# Patient Record
Sex: MALE | Race: WHITE | NOT HISPANIC OR LATINO | Employment: OTHER | URBAN - NONMETROPOLITAN AREA
[De-identification: names, ages, dates, MRNs, and addresses within clinical notes are randomized per-mention and may not be internally consistent; named-entity substitution may affect disease eponyms.]

---

## 2023-05-01 ENCOUNTER — OFFICE VISIT (OUTPATIENT)
Dept: PRIMARY CARE | Facility: CLINIC | Age: 63
End: 2023-05-01
Payer: COMMERCIAL

## 2023-05-01 VITALS
HEIGHT: 72 IN | SYSTOLIC BLOOD PRESSURE: 128 MMHG | DIASTOLIC BLOOD PRESSURE: 80 MMHG | WEIGHT: 204.7 LBS | HEART RATE: 56 BPM | BODY MASS INDEX: 27.73 KG/M2

## 2023-05-01 DIAGNOSIS — Z12.11 COLON CANCER SCREENING: Primary | ICD-10-CM

## 2023-05-01 DIAGNOSIS — J44.9 MODERATE COPD (CHRONIC OBSTRUCTIVE PULMONARY DISEASE) (MULTI): ICD-10-CM

## 2023-05-01 DIAGNOSIS — Z12.5 ENCOUNTER FOR SCREENING FOR MALIGNANT NEOPLASM OF PROSTATE: ICD-10-CM

## 2023-05-01 DIAGNOSIS — Z00.00 HEALTHCARE MAINTENANCE: ICD-10-CM

## 2023-05-01 DIAGNOSIS — Z13.220 SCREENING CHOLESTEROL LEVEL: ICD-10-CM

## 2023-05-01 PROCEDURE — 1036F TOBACCO NON-USER: CPT | Performed by: FAMILY MEDICINE

## 2023-05-01 PROCEDURE — 99214 OFFICE O/P EST MOD 30 MIN: CPT | Performed by: FAMILY MEDICINE

## 2023-05-01 RX ORDER — ALBUTEROL SULFATE 90 UG/1
AEROSOL, METERED RESPIRATORY (INHALATION)
COMMUNITY
End: 2023-05-12 | Stop reason: SDUPTHER

## 2023-05-01 ASSESSMENT — PATIENT HEALTH QUESTIONNAIRE - PHQ9
2. FEELING DOWN, DEPRESSED OR HOPELESS: NOT AT ALL
SUM OF ALL RESPONSES TO PHQ9 QUESTIONS 1 AND 2: 0
1. LITTLE INTEREST OR PLEASURE IN DOING THINGS: NOT AT ALL

## 2023-05-01 NOTE — PROGRESS NOTES
Subjective   Patient ID: Black Mao is a 62 y.o. male who presents for 6 month .    HPI   First day of detention   Flipping houses now   States feels better respiration since no longer in shop     Mod copd  Albuterol daily   Spiriva made no difference     Alopecia   Left forehead has area that is rough      Review of Systems    Objective   /80   Pulse 56   Ht 1.829 m (6')   Wt 92.9 kg (204 lb 11.2 oz)   BMI 27.76 kg/m²     Physical Exam  Constitutional:       Appearance: Normal appearance.   HENT:      Head: Normocephalic and atraumatic.   Eyes:      Conjunctiva/sclera: Conjunctivae normal.      Pupils: Pupils are equal, round, and reactive to light.   Cardiovascular:      Rate and Rhythm: Normal rate and regular rhythm.      Heart sounds: Normal heart sounds.   Pulmonary:      Effort: Pulmonary effort is normal.      Breath sounds: Normal breath sounds.   Lymphadenopathy:      Cervical: No cervical adenopathy.   Skin:     Coloration: Skin is not jaundiced.   Neurological:      General: No focal deficit present.      Mental Status: He is alert and oriented to person, place, and time.   Psychiatric:         Mood and Affect: Mood normal.         Behavior: Behavior normal.         Thought Content: Thought content normal.         Judgment: Judgment normal.         Assessment/Plan   Diagnoses and all orders for this visit:  Colon cancer screening  -     Cologuard® colon cancer screening; Future  Healthcare maintenance  -     Prostate Specific Antigen, Screen; Future  Encounter for screening for malignant neoplasm of prostate  -     Prostate Specific Antigen, Screen; Future  Screening cholesterol level  -     Lipid Panel; Future  Moderate COPD (chronic obstructive pulmonary disease) (CMS/HCC)  Other orders  -     Follow Up In Primary Care; Future

## 2023-05-09 ENCOUNTER — LAB (OUTPATIENT)
Dept: LAB | Facility: LAB | Age: 63
End: 2023-05-09
Payer: COMMERCIAL

## 2023-05-09 DIAGNOSIS — Z00.00 HEALTHCARE MAINTENANCE: ICD-10-CM

## 2023-05-09 DIAGNOSIS — Z12.5 ENCOUNTER FOR SCREENING FOR MALIGNANT NEOPLASM OF PROSTATE: ICD-10-CM

## 2023-05-09 DIAGNOSIS — Z13.220 SCREENING CHOLESTEROL LEVEL: ICD-10-CM

## 2023-05-09 LAB
CHOLESTEROL (MG/DL) IN SER/PLAS: 242 MG/DL (ref 0–199)
CHOLESTEROL IN HDL (MG/DL) IN SER/PLAS: 44 MG/DL
CHOLESTEROL/HDL RATIO: 5.5
LDL: ABNORMAL MG/DL (ref 0–99)
NON HDL CHOLESTEROL: 198 MG/DL
PROSTATE SPECIFIC ANTIGEN,SCREEN: 0.53 NG/ML (ref 0–4)
TRIGLYCERIDE (MG/DL) IN SER/PLAS: 426 MG/DL (ref 0–149)
VLDL: ABNORMAL MG/DL (ref 0–40)

## 2023-05-09 PROCEDURE — 80061 LIPID PANEL: CPT

## 2023-05-09 PROCEDURE — 84153 ASSAY OF PSA TOTAL: CPT

## 2023-05-09 PROCEDURE — 36415 COLL VENOUS BLD VENIPUNCTURE: CPT

## 2023-05-12 DIAGNOSIS — J44.9 MODERATE COPD (CHRONIC OBSTRUCTIVE PULMONARY DISEASE) (MULTI): ICD-10-CM

## 2023-05-12 RX ORDER — ALBUTEROL SULFATE 90 UG/1
AEROSOL, METERED RESPIRATORY (INHALATION)
Qty: 18 G | Refills: 1 | Status: SHIPPED | OUTPATIENT
Start: 2023-05-12 | End: 2023-07-31 | Stop reason: SDUPTHER

## 2023-05-13 LAB — NONINV COLON CA DNA+OCC BLD SCRN STL QL: POSITIVE

## 2023-05-15 ENCOUNTER — TELEPHONE (OUTPATIENT)
Dept: PRIMARY CARE | Facility: CLINIC | Age: 63
End: 2023-05-15

## 2023-05-15 DIAGNOSIS — R19.5 POSITIVE COLORECTAL CANCER SCREENING USING COLOGUARD TEST: Primary | ICD-10-CM

## 2023-05-15 DIAGNOSIS — E78.00 HIGH CHOLESTEROL: Primary | ICD-10-CM

## 2023-05-15 RX ORDER — ROSUVASTATIN CALCIUM 10 MG/1
10 TABLET, COATED ORAL DAILY
Qty: 30 TABLET | Refills: 11 | Status: SHIPPED | OUTPATIENT
Start: 2023-05-15 | End: 2024-05-02 | Stop reason: SDUPTHER

## 2023-06-01 ENCOUNTER — TELEPHONE (OUTPATIENT)
Dept: PRIMARY CARE | Facility: CLINIC | Age: 63
End: 2023-06-01
Payer: COMMERCIAL

## 2023-06-01 NOTE — TELEPHONE ENCOUNTER
Has an appointment for a colonoscopy June 19. He has new insurance and needs our office to call the insurance to tell them the procedure is going to be done. Asking for a call back.

## 2023-06-19 ENCOUNTER — HOSPITAL ENCOUNTER (OUTPATIENT)
Dept: DATA CONVERSION | Facility: HOSPITAL | Age: 63
End: 2023-06-19
Attending: SURGERY | Admitting: SURGERY
Payer: COMMERCIAL

## 2023-06-19 DIAGNOSIS — Z12.11 ENCOUNTER FOR SCREENING FOR MALIGNANT NEOPLASM OF COLON: ICD-10-CM

## 2023-06-19 DIAGNOSIS — D12.5 BENIGN NEOPLASM OF SIGMOID COLON: ICD-10-CM

## 2023-06-19 DIAGNOSIS — D12.3 BENIGN NEOPLASM OF TRANSVERSE COLON: ICD-10-CM

## 2023-06-19 DIAGNOSIS — K57.30 DIVERTICULOSIS OF LARGE INTESTINE WITHOUT PERFORATION OR ABSCESS WITHOUT BLEEDING: ICD-10-CM

## 2023-06-19 DIAGNOSIS — R19.5 OTHER FECAL ABNORMALITIES: ICD-10-CM

## 2023-06-19 DIAGNOSIS — J44.9 CHRONIC OBSTRUCTIVE PULMONARY DISEASE, UNSPECIFIED (MULTI): ICD-10-CM

## 2023-06-22 LAB
COMPLETE PATHOLOGY REPORT: NORMAL
CONVERTED CLINICAL DIAGNOSIS-HISTORY: NORMAL
CONVERTED FINAL DIAGNOSIS: NORMAL
CONVERTED FINAL REPORT PDF LINK TO COPY AND PASTE: NORMAL
CONVERTED GROSS DESCRIPTION: NORMAL

## 2023-07-31 DIAGNOSIS — J44.9 MODERATE COPD (CHRONIC OBSTRUCTIVE PULMONARY DISEASE) (MULTI): ICD-10-CM

## 2023-07-31 RX ORDER — ALBUTEROL SULFATE 90 UG/1
AEROSOL, METERED RESPIRATORY (INHALATION)
Qty: 18 G | Refills: 1 | Status: SHIPPED | OUTPATIENT
Start: 2023-07-31 | End: 2023-10-06 | Stop reason: SDUPTHER

## 2023-09-30 NOTE — H&P
History of Present Illness:   Admission Reason: colon cancer screening   HPI:    ALYSIA BURNETTE is a 63 year old Male in need of colonoscopy.  He had a Cologuard test that came back positive.  His last colonoscopy was November 2011 by Dr. Nagy.  He had no polyps.  He has no family history of colon or rectal cancer. He has bowel movements at least once daily.  He is not on any stool softeners, fiber supplements, or laxatives.  He is not on any  blood thinners or antiplatelet agents.  He denies ever seeing blood in the stool.    Comorbidities:   Comorbidites:  ·  Comorbid Conditions chronic obstructive pulmonary disease   ·  COPD unknown     Past Medical/Surgical History:        Medical History:   COPD (chronic obstructive pulmonary disease):    High cholesterol:    Amputation of finger:        Surg History:   History of tonsillectomy and adenoidectomy:    History of inguinal hernia repair:    Previous back surgery:    History of colonoscopy:    History of hand surgery:     Family History:   Family History:    Family History:   No family history of colon or rectal cancer.    Social History:  Former smoker. No alcohol use.    ROS:  Constitutional:  no fever, sweats, and chills  Cardiovascular: No chest pain  Respiratory: No cough or shortness of breath  Gastrointestinal: No blood in stool or dark bowel movements. No change in bowel movements. No significant abdominal pain, mild cramping with bowel prep.  Genitourinary: no dysuria  Musculoskeletal: no weakness or swelling  Integumentary: no rashes  Neurological: no confusion  Endocrine: no heat or cold intolerance  Heme/Lymph: no easy bruising or bleeding           Allergies:  ·  No Known Allergies :     Medications Prior to Admission:   Admission Medication Reconciliation has not been completed for this patient.    Objective:   Physical Exam by System:    Constitutional: NAD, lying supine in bed   Eyes: no scleral icterus   Respiratory/Thorax: no labored  breathing   Cardiovascular: NSR   Gastrointestinal: soft, non-distended   Musculoskeletal: full range of motion   Extremities: no peripheral edema   Neurological: alert, oriented x3   Psychological: normal affect   Skin: no jaundice     Assessment and Plan:   Assessment:    Mr. Mao is a 64yo male in need of colonoscopy in work-up of positive Cologuard test.  Risks and benefit of colonoscopy were discussed with the patient.  This  included risk of bleeding, perforation, missed polyps, incomplete colonoscopy, and potential need for additional procedures pending findings.  He was agreeable to proceed.      Electronic Signatures:  Mary Grace Gutierrez)  (Signed 19-Jun-2023 09:32)   Authored: History of Present Illness, Comorbidities,  Past Medical/Surgical History, Family History, Allergies, Medications Prior to Admission, Objective, Assessment and Plan, Note Completion      Last Updated: 19-Jun-2023 09:32 by Mary Grace Gutierrez)

## 2023-10-06 DIAGNOSIS — J44.9 MODERATE COPD (CHRONIC OBSTRUCTIVE PULMONARY DISEASE) (MULTI): ICD-10-CM

## 2023-10-06 RX ORDER — ALBUTEROL SULFATE 90 UG/1
AEROSOL, METERED RESPIRATORY (INHALATION)
Qty: 18 G | Refills: 3 | Status: SHIPPED | OUTPATIENT
Start: 2023-10-06 | End: 2023-12-26 | Stop reason: SDUPTHER

## 2023-11-10 ENCOUNTER — TELEMEDICINE (OUTPATIENT)
Dept: PRIMARY CARE | Facility: CLINIC | Age: 63
End: 2023-11-10
Payer: COMMERCIAL

## 2023-11-10 DIAGNOSIS — U07.1 COVID-19 VIRUS INFECTION: Primary | ICD-10-CM

## 2023-11-10 PROCEDURE — 99213 OFFICE O/P EST LOW 20 MIN: CPT | Performed by: FAMILY MEDICINE

## 2023-11-10 NOTE — PROGRESS NOTES
Subjective   Patient ID: Black Mao is a 63 y.o. male who presents for pt started with cold sx 11-6-23, tested pos COVID 11-8-23 (Pt c/o cold symptoms and feeing tired ).    HPI   Video virtual visit.  Monday night November 6 sri had fevers chills achy mild headache tested for positive on the eighth wife also is positive COVID this is the first time he had COVID he has taken Motrin x2.  He would like to start Paxlovid.  I instructed him to not take Crestor while on the Paxlovid.    No respiratory distress  Review of Systems    Objective   There were no vitals taken for this visit.    Physical Exam  Constitutional:       Appearance: Normal appearance.   HENT:      Head: Normocephalic and atraumatic.   Eyes:      Conjunctiva/sclera: Conjunctivae normal.      Pupils: Pupils are equal, round, and reactive to light.   Cardiovascular:      Rate and Rhythm: Regular rhythm.   Pulmonary:      Effort: Pulmonary effort is normal.   Lymphadenopathy:      Cervical: No cervical adenopathy.   Skin:     Coloration: Skin is not jaundiced.   Neurological:      General: No focal deficit present.      Mental Status: He is alert and oriented to person, place, and time.   Psychiatric:         Mood and Affect: Mood normal.         Behavior: Behavior normal.         Thought Content: Thought content normal.         Judgment: Judgment normal.         Assessment/Plan   Diagnoses and all orders for this visit:  COVID-19 virus infection  -     nirmatrelvir-ritonavir (PAXLOVID) 300 mg (150 mg x 2)-100 mg tablet therapy pack; Take 3 tablets by mouth 2 times a day for 5 days. Follow the instructions on the package  Recommend vitamin C vitamin D zinc.

## 2023-12-26 DIAGNOSIS — J44.9 MODERATE COPD (CHRONIC OBSTRUCTIVE PULMONARY DISEASE) (MULTI): ICD-10-CM

## 2023-12-26 NOTE — TELEPHONE ENCOUNTER
Medication Refill Request    Medication:  albuterol inhaler    Pharmacy:  [facility]    Last OV:  11/10/23  Upcoming appointment:  5/2/24    ORDER PENDED

## 2023-12-26 NOTE — TELEPHONE ENCOUNTER
albuterol 90 mcg/actuation inhaler [[id number]]    Order Details  Dose, Route, Frequency: As Directed   Dispense Quantity: 18 g Refills: 3          Sig: INHALE 2 PUFFS BY MOUTH EVERY 4 HOURS AS NEEDED FOR SHORTNESS OF BREATH & FOR WHEEZING         Start Date: 10/06/23 End Date: --   Written Date: 10/06/23 Rx Expiration Date: 10/05/24        Associated Diagnoses: Moderate COPD (chronic obstructive pulmonary disease) (CMS/[facility]) [J44.9]   Original Order: albuterol 90 mcg/actuation inhaler [[id number]]   [facility] IS [facility]. PT PHONE NUMBER IS [id number]. THANK YOU.

## 2023-12-27 RX ORDER — ALBUTEROL SULFATE 90 UG/1
AEROSOL, METERED RESPIRATORY (INHALATION)
Qty: 18 G | Refills: 3 | Status: SHIPPED | OUTPATIENT
Start: 2023-12-27 | End: 2024-05-02 | Stop reason: SDUPTHER

## 2024-05-02 ENCOUNTER — OFFICE VISIT (OUTPATIENT)
Dept: PRIMARY CARE | Facility: CLINIC | Age: 64
End: 2024-05-02
Payer: COMMERCIAL

## 2024-05-02 ENCOUNTER — LAB (OUTPATIENT)
Dept: LAB | Facility: LAB | Age: 64
End: 2024-05-02
Payer: COMMERCIAL

## 2024-05-02 VITALS
TEMPERATURE: 97.6 F | DIASTOLIC BLOOD PRESSURE: 78 MMHG | BODY MASS INDEX: 28.22 KG/M2 | OXYGEN SATURATION: 98 % | WEIGHT: 208.1 LBS | HEART RATE: 54 BPM | SYSTOLIC BLOOD PRESSURE: 124 MMHG

## 2024-05-02 DIAGNOSIS — Z00.00 HEALTHCARE MAINTENANCE: ICD-10-CM

## 2024-05-02 DIAGNOSIS — E78.00 HIGH CHOLESTEROL: ICD-10-CM

## 2024-05-02 DIAGNOSIS — J44.9 MODERATE COPD (CHRONIC OBSTRUCTIVE PULMONARY DISEASE) (MULTI): ICD-10-CM

## 2024-05-02 DIAGNOSIS — M25.512 ACUTE PAIN OF BOTH SHOULDERS: ICD-10-CM

## 2024-05-02 DIAGNOSIS — Z12.5 ENCOUNTER FOR SCREENING FOR MALIGNANT NEOPLASM OF PROSTATE: Primary | ICD-10-CM

## 2024-05-02 DIAGNOSIS — Z12.5 ENCOUNTER FOR SCREENING FOR MALIGNANT NEOPLASM OF PROSTATE: ICD-10-CM

## 2024-05-02 DIAGNOSIS — M25.511 ACUTE PAIN OF BOTH SHOULDERS: ICD-10-CM

## 2024-05-02 LAB
ANION GAP SERPL CALC-SCNC: 11 MMOL/L (ref 10–20)
BUN SERPL-MCNC: 14 MG/DL (ref 6–23)
CALCIUM SERPL-MCNC: 9.2 MG/DL (ref 8.6–10.3)
CHLORIDE SERPL-SCNC: 106 MMOL/L (ref 98–107)
CHOLEST SERPL-MCNC: 155 MG/DL (ref 0–199)
CHOLESTEROL/HDL RATIO: 3.6
CO2 SERPL-SCNC: 27 MMOL/L (ref 21–32)
CREAT SERPL-MCNC: 0.8 MG/DL (ref 0.5–1.3)
EGFRCR SERPLBLD CKD-EPI 2021: >90 ML/MIN/1.73M*2
GLUCOSE SERPL-MCNC: 103 MG/DL (ref 74–99)
HDLC SERPL-MCNC: 43 MG/DL
LDLC SERPL CALC-MCNC: 78 MG/DL
NON HDL CHOLESTEROL: 112 MG/DL (ref 0–149)
POTASSIUM SERPL-SCNC: 4.1 MMOL/L (ref 3.5–5.3)
PSA SERPL-MCNC: 0.93 NG/ML
SODIUM SERPL-SCNC: 140 MMOL/L (ref 136–145)
TRIGL SERPL-MCNC: 170 MG/DL (ref 0–149)
VLDL: 34 MG/DL (ref 0–40)

## 2024-05-02 PROCEDURE — 1036F TOBACCO NON-USER: CPT | Performed by: FAMILY MEDICINE

## 2024-05-02 PROCEDURE — 36415 COLL VENOUS BLD VENIPUNCTURE: CPT

## 2024-05-02 PROCEDURE — 80061 LIPID PANEL: CPT

## 2024-05-02 PROCEDURE — 84153 ASSAY OF PSA TOTAL: CPT

## 2024-05-02 PROCEDURE — 80048 BASIC METABOLIC PNL TOTAL CA: CPT

## 2024-05-02 PROCEDURE — 99214 OFFICE O/P EST MOD 30 MIN: CPT | Performed by: FAMILY MEDICINE

## 2024-05-02 RX ORDER — ROSUVASTATIN CALCIUM 10 MG/1
10 TABLET, COATED ORAL DAILY
Qty: 30 TABLET | Refills: 11 | Status: SHIPPED | OUTPATIENT
Start: 2024-05-02 | End: 2025-05-02

## 2024-05-02 RX ORDER — ALBUTEROL SULFATE 90 UG/1
AEROSOL, METERED RESPIRATORY (INHALATION)
Qty: 18 G | Refills: 3 | Status: SHIPPED | OUTPATIENT
Start: 2024-05-02

## 2024-05-02 ASSESSMENT — ENCOUNTER SYMPTOMS
DYSURIA: 0
HEMATURIA: 0

## 2024-05-02 NOTE — PROGRESS NOTES
Subjective   Patient ID: Black Mao is a 63 y.o. male who presents for Hyperlipidemia and COPD.    Hyperlipidemia  Pertinent negatives include no chest pain.        Mod copd per pfts 2022 with no sign improvement with BD   Albuterol daily   Spiriva made no difference   No sneezing no watery itchy eyes    Positive cologuard 2023   Colonoscopy June 2023 repeat 3 years       Both shoulder pain  No limitations   Hurts laying down   X 1 months   Repetitive none  No weakness   On statin 1 year       Review of Systems   Cardiovascular:  Negative for chest pain.   Genitourinary:  Negative for dysuria and hematuria.       Objective   /78 (BP Location: Left arm, Patient Position: Sitting)   Pulse 54   Temp 36.4 °C (97.6 °F)   Wt 94.4 kg (208 lb 1.6 oz)   SpO2 98%   BMI 28.22 kg/m²     Physical Exam  Constitutional:       Appearance: Normal appearance.   HENT:      Head: Normocephalic and atraumatic.   Eyes:      Conjunctiva/sclera: Conjunctivae normal.      Pupils: Pupils are equal, round, and reactive to light.   Cardiovascular:      Rate and Rhythm: Normal rate and regular rhythm.      Heart sounds: Normal heart sounds.   Pulmonary:      Effort: Pulmonary effort is normal.      Breath sounds: Normal breath sounds.   Musculoskeletal:      Comments: Marshall shoulders from normal ms   Has min tenderness over the prox biceps tendon marshall     Lymphadenopathy:      Cervical: No cervical adenopathy.   Skin:     Coloration: Skin is not jaundiced.   Neurological:      General: No focal deficit present.      Mental Status: He is alert and oriented to person, place, and time.   Psychiatric:         Mood and Affect: Mood normal.         Behavior: Behavior normal.         Thought Content: Thought content normal.         Judgment: Judgment normal.         Assessment/Plan   Diagnoses and all orders for this visit:  Encounter for screening for malignant neoplasm of prostate  -     Prostate Specific Antigen, Screen; Future  High  cholesterol  -     rosuvastatin (Crestor) 10 mg tablet; Take 1 tablet (10 mg) by mouth once daily.  -     Basic Metabolic Panel; Future  -     Lipid Panel; Future  Moderate COPD (chronic obstructive pulmonary disease) (Multi)  -     albuterol 90 mcg/actuation inhaler; INHALE 2 PUFFS BY MOUTH EVERY 4 HOURS AS NEEDED FOR SHORTNESS OF BREATH & FOR WHEEZING  Healthcare maintenance  -     Prostate Specific Antigen, Screen; Future  Acute pain of both shoulders  Other orders  -     Follow Up In Primary Care    HEP given for shoulders     I don't think shoulder pain is from statin since not myoathy but more tendonitis

## 2024-11-15 DIAGNOSIS — J44.9 MODERATE COPD (CHRONIC OBSTRUCTIVE PULMONARY DISEASE) (MULTI): ICD-10-CM

## 2024-11-15 RX ORDER — ALBUTEROL SULFATE 90 UG/1
INHALANT RESPIRATORY (INHALATION)
Qty: 18 G | Refills: 3 | Status: SHIPPED | OUTPATIENT
Start: 2024-11-15

## 2024-11-15 NOTE — TELEPHONE ENCOUNTER
Medication Refill Request    Medication:  albuterol inhaler    Pharmacy:  [facility]    Last OV:  5/2/24  Upcoming appointment:  5/2/25    ORDER PENDED

## 2025-03-13 DIAGNOSIS — J44.9 MODERATE COPD (CHRONIC OBSTRUCTIVE PULMONARY DISEASE) (MULTI): ICD-10-CM

## 2025-03-13 RX ORDER — ALBUTEROL SULFATE 90 UG/1
INHALANT RESPIRATORY (INHALATION)
Qty: 18 G | Refills: 3 | Status: SHIPPED | OUTPATIENT
Start: 2025-03-13

## 2025-05-02 ENCOUNTER — APPOINTMENT (OUTPATIENT)
Dept: PRIMARY CARE | Facility: CLINIC | Age: 65
End: 2025-05-02
Payer: COMMERCIAL

## 2025-05-02 VITALS
BODY MASS INDEX: 29.5 KG/M2 | WEIGHT: 217.5 LBS | SYSTOLIC BLOOD PRESSURE: 130 MMHG | OXYGEN SATURATION: 95 % | DIASTOLIC BLOOD PRESSURE: 70 MMHG | HEART RATE: 56 BPM

## 2025-05-02 DIAGNOSIS — J44.9 MODERATE COPD (CHRONIC OBSTRUCTIVE PULMONARY DISEASE) (MULTI): ICD-10-CM

## 2025-05-02 DIAGNOSIS — Z12.5 ENCOUNTER FOR SCREENING FOR MALIGNANT NEOPLASM OF PROSTATE: ICD-10-CM

## 2025-05-02 DIAGNOSIS — E78.00 HIGH CHOLESTEROL: ICD-10-CM

## 2025-05-02 DIAGNOSIS — Z00.00 HEALTHCARE MAINTENANCE: ICD-10-CM

## 2025-05-02 DIAGNOSIS — B35.1 ONYCHOMYCOSIS: Primary | ICD-10-CM

## 2025-05-02 PROCEDURE — 1036F TOBACCO NON-USER: CPT | Performed by: FAMILY MEDICINE

## 2025-05-02 PROCEDURE — 99214 OFFICE O/P EST MOD 30 MIN: CPT | Performed by: FAMILY MEDICINE

## 2025-05-02 RX ORDER — ROSUVASTATIN CALCIUM 10 MG/1
10 TABLET, COATED ORAL DAILY
Qty: 100 TABLET | Refills: 3 | Status: SHIPPED | OUTPATIENT
Start: 2025-05-02

## 2025-05-02 RX ORDER — TERBINAFINE HYDROCHLORIDE 250 MG/1
250 TABLET ORAL DAILY
Qty: 90 TABLET | Refills: 0 | Status: SHIPPED | OUTPATIENT
Start: 2025-05-02 | End: 2025-07-31

## 2025-05-02 RX ORDER — FLUTICASONE PROPIONATE AND SALMETEROL 250; 50 UG/1; UG/1
1 POWDER RESPIRATORY (INHALATION)
Qty: 60 EACH | Refills: 0 | Status: SHIPPED | OUTPATIENT
Start: 2025-05-02 | End: 2026-05-02

## 2025-05-02 ASSESSMENT — ENCOUNTER SYMPTOMS
PALPITATIONS: 0
DIFFICULTY URINATING: 0
POLYPHAGIA: 0
COUGH: 0
SHORTNESS OF BREATH: 0
POLYDIPSIA: 0

## 2025-05-02 NOTE — PATIENT INSTRUCTIONS
Current weight: 98.7 kg (217 lb 8 oz)  Weight change since last visit (-) denotes wt loss 9.4 lbs   Weight loss needed to achieve BMI 25:   Lbs  Weight loss needed to achieve BMI 30:   Lbs

## 2025-05-02 NOTE — PROGRESS NOTES
Subjective   Patient ID: Black Mao is a 64 y.o. male who presents for Hyperlipidemia and Moderate COPD.  HPI  Patient Health Questionnaire-2 Score: 0 (5/2/2025  8:16 AM)     P20 will wait until 65 yo    Left great toenail ingrown and tries to dig out himself     Will treat with terfinafine     Mod copd per pfts 2022 with no sign improvement with BD   Albuterol daily   Spiriva made no difference   Will try adviar     Positive cologuard 2023   Colonoscopy June 2023 repeat 3 years             Review of Systems   Respiratory:  Negative for cough and shortness of breath.    Cardiovascular:  Negative for chest pain and palpitations.   Endocrine: Negative for polydipsia and polyphagia.   Genitourinary:  Negative for difficulty urinating.       Objective   Visit Vitals  /70   Pulse 56   Wt 98.7 kg (217 lb 8 oz)   SpO2 95%   BMI 29.50 kg/m²   Smoking Status Former   BSA 2.24 m²       Physical Exam  Vitals reviewed.   Constitutional:       Appearance: Normal appearance.   HENT:      Head: Normocephalic and atraumatic.   Eyes:      Conjunctiva/sclera: Conjunctivae normal.   Cardiovascular:      Rate and Rhythm: Normal rate and regular rhythm.   Pulmonary:      Effort: Pulmonary effort is normal.      Breath sounds: Normal breath sounds.   Musculoskeletal:      Cervical back: Neck supple.   Skin:     General: Skin is warm and dry.      Comments: Left great toenail thick yellow and incurvated with no granulation tissue has some tenderness red no dc laterally    Neurological:      General: No focal deficit present.      Mental Status: He is alert and oriented to person, place, and time.   Psychiatric:         Mood and Affect: Mood normal.         Behavior: Behavior normal.         Thought Content: Thought content normal.         Judgment: Judgment normal.              Assessment/Plan   Diagnoses and all orders for this visit:  Onychomycosis  -     terbinafine (LamISIL) 250 mg tablet; Take 1 tablet (250 mg) by mouth  once daily.  High cholesterol  -     rosuvastatin (Crestor) 10 mg tablet; Take 1 tablet (10 mg) by mouth once daily.  -     Comprehensive Metabolic Panel; Future  Encounter for screening for malignant neoplasm of prostate  -     Prostate Specific Antigen, Screen; Future  Healthcare maintenance  -     Prostate Specific Antigen, Screen; Future  Moderate COPD (chronic obstructive pulmonary disease) (Multi)  -     fluticasone propion-salmeteroL (Advair Diskus) 250-50 mcg/dose diskus inhaler; Inhale 1 puff 2 times a day. Rinse mouth with water after use to reduce aftertaste and incidence of candidiasis. Do not swallow.           Asim Donohue MD 05/02/25 8:44 AM

## 2025-05-16 ENCOUNTER — OFFICE VISIT (OUTPATIENT)
Dept: PRIMARY CARE | Facility: CLINIC | Age: 65
End: 2025-05-16
Payer: COMMERCIAL

## 2025-05-16 ENCOUNTER — HOSPITAL ENCOUNTER (OUTPATIENT)
Dept: RADIOLOGY | Facility: CLINIC | Age: 65
Discharge: HOME | End: 2025-05-16
Payer: COMMERCIAL

## 2025-05-16 VITALS
OXYGEN SATURATION: 96 % | BODY MASS INDEX: 28.68 KG/M2 | DIASTOLIC BLOOD PRESSURE: 78 MMHG | SYSTOLIC BLOOD PRESSURE: 140 MMHG | HEART RATE: 82 BPM | WEIGHT: 211.5 LBS

## 2025-05-16 DIAGNOSIS — R06.02 SHORTNESS OF BREATH: ICD-10-CM

## 2025-05-16 DIAGNOSIS — R06.02 SHORTNESS OF BREATH: Primary | ICD-10-CM

## 2025-05-16 PROCEDURE — 71046 X-RAY EXAM CHEST 2 VIEWS: CPT

## 2025-05-16 PROCEDURE — 99214 OFFICE O/P EST MOD 30 MIN: CPT | Performed by: FAMILY MEDICINE

## 2025-05-16 ASSESSMENT — ENCOUNTER SYMPTOMS
FEVER: 0
POLYDIPSIA: 0
DIFFICULTY URINATING: 0
PALPITATIONS: 0
POLYPHAGIA: 0
COUGH: 1
CHILLS: 0
HEADACHES: 0
NAUSEA: 0

## 2025-05-16 NOTE — PROGRESS NOTES
Subjective   Patient ID: Black Mao is a 65 y.o. male who presents for Shortness of Breath (Has COPD, has a dry cough, wheezing.  Worsens when he tries to lay down and feels it is worst on the right side.  States when he is up doing things he is ok and has not slept in 2 days.  When using the Advair inhaler had a burning sensation in his chest).  HPI  No data recorded     Has lost 6 pounds in 2 days and trying to loose    Stopped drinking diet pepsi      Has pnd   More winded with exercise   Right side burning not pain   Like pyrosis   Comes and goes   Has at rest   When chasing dog sob no burning discomfort   Slept in chair last night       Copd advair and quit albuterol     Stop for awhile     But has used the last 2 days       Bicycle ride did fine 1 hours with no issues         Review of Systems   Constitutional:  Negative for chills and fever.   Respiratory:  Positive for cough (dry).    Cardiovascular:  Negative for palpitations and leg swelling.   Gastrointestinal:  Negative for nausea.   Endocrine: Negative for polydipsia and polyphagia.   Genitourinary:  Negative for difficulty urinating.   Neurological:  Negative for headaches.       Objective   Visit Vitals  /78   Pulse 82   Wt 95.9 kg (211 lb 8 oz)   SpO2 96%   BMI 28.68 kg/m²   Smoking Status Former   BSA 2.21 m²       Physical Exam  Constitutional:       Appearance: Normal appearance.   HENT:      Head: Normocephalic and atraumatic.   Eyes:      Conjunctiva/sclera: Conjunctivae normal.      Pupils: Pupils are equal, round, and reactive to light.   Cardiovascular:      Rate and Rhythm: Normal rate and regular rhythm.      Heart sounds: Normal heart sounds.   Pulmonary:      Effort: Pulmonary effort is normal.      Breath sounds: Wheezing present.   Abdominal:      General: Abdomen is flat. Bowel sounds are normal.      Palpations: Abdomen is soft.   Musculoskeletal:      Right lower leg: No edema.      Left lower leg: No edema.    Lymphadenopathy:      Cervical: No cervical adenopathy.   Skin:     Coloration: Skin is not jaundiced.   Neurological:      General: No focal deficit present.      Mental Status: He is alert and oriented to person, place, and time.   Psychiatric:         Mood and Affect: Mood normal.         Behavior: Behavior normal.         Thought Content: Thought content normal.         Judgment: Judgment normal.              Assessment/Plan   Diagnoses and all orders for this visit:  Shortness of breath  -     XR chest 2 views; Future  -     B-type natriuretic peptide; Future  -     CBC and Auto Differential; Future  -     Comprehensive Metabolic Panel; Future      Do not believe this is heart related no edema with weight loss unlikely heart failure.  Seems to be more COPD related will wait on chest x-ray labs and treat accordingly BNP normal suggests short prednisone dose.         Asim Donohue MD 05/16/25 9:18 AM

## 2025-07-18 DIAGNOSIS — J44.9 MODERATE COPD (CHRONIC OBSTRUCTIVE PULMONARY DISEASE) (MULTI): ICD-10-CM

## 2025-07-18 RX ORDER — ALBUTEROL SULFATE 90 UG/1
INHALANT RESPIRATORY (INHALATION)
Qty: 18 G | Refills: 3 | Status: SHIPPED | OUTPATIENT
Start: 2025-07-18

## 2026-05-04 ENCOUNTER — APPOINTMENT (OUTPATIENT)
Dept: PRIMARY CARE | Facility: CLINIC | Age: 66
End: 2026-05-04
Payer: COMMERCIAL